# Patient Record
Sex: MALE | Race: BLACK OR AFRICAN AMERICAN | Employment: PART TIME | URBAN - METROPOLITAN AREA
[De-identification: names, ages, dates, MRNs, and addresses within clinical notes are randomized per-mention and may not be internally consistent; named-entity substitution may affect disease eponyms.]

---

## 2021-09-09 ENCOUNTER — HOSPITAL ENCOUNTER (EMERGENCY)
Age: 26
Discharge: HOME | End: 2021-09-10
Attending: EMERGENCY MEDICINE
Payer: COMMERCIAL

## 2021-09-09 VITALS
SYSTOLIC BLOOD PRESSURE: 138 MMHG | WEIGHT: 197 LBS | OXYGEN SATURATION: 99 % | DIASTOLIC BLOOD PRESSURE: 84 MMHG | TEMPERATURE: 97.8 F | RESPIRATION RATE: 16 BRPM | BODY MASS INDEX: 29.18 KG/M2 | HEIGHT: 69 IN | HEART RATE: 81 BPM

## 2021-09-09 DIAGNOSIS — K62.5 RECTAL BLEEDING: ICD-10-CM

## 2021-09-09 DIAGNOSIS — R10.9 ABDOMINAL PAIN OF UNKNOWN ETIOLOGY: ICD-10-CM

## 2021-09-09 DIAGNOSIS — G62.9 PERIPHERAL POLYNEUROPATHY: Primary | ICD-10-CM

## 2021-09-09 LAB
ALBUMIN SERPL-MCNC: 4.2 G/DL (ref 3.5–5)
ALBUMIN/GLOB SERPL: 1.1 {RATIO} (ref 1.2–3.5)
ALP SERPL-CCNC: 60 U/L (ref 50–136)
ALT SERPL-CCNC: 33 U/L (ref 12–65)
ANION GAP SERPL CALC-SCNC: 6 MMOL/L (ref 7–16)
AST SERPL-CCNC: 28 U/L (ref 15–37)
BILIRUB SERPL-MCNC: 0.3 MG/DL (ref 0.2–1.1)
BUN SERPL-MCNC: 18 MG/DL (ref 6–23)
CALCIUM SERPL-MCNC: 9.4 MG/DL (ref 8.3–10.4)
CHLORIDE SERPL-SCNC: 106 MMOL/L (ref 98–107)
CO2 SERPL-SCNC: 31 MMOL/L (ref 21–32)
CREAT SERPL-MCNC: 1 MG/DL (ref 0.8–1.5)
GLOBULIN SER CALC-MCNC: 3.7 G/DL (ref 2.3–3.5)
GLUCOSE SERPL-MCNC: 89 MG/DL (ref 65–100)
LIPASE SERPL-CCNC: 295 U/L (ref 73–393)
POTASSIUM SERPL-SCNC: 3.8 MMOL/L (ref 3.5–5.1)
PROT SERPL-MCNC: 7.9 G/DL (ref 6.3–8.2)
SODIUM SERPL-SCNC: 143 MMOL/L (ref 136–145)

## 2021-09-09 PROCEDURE — 96374 THER/PROPH/DIAG INJ IV PUSH: CPT

## 2021-09-09 PROCEDURE — 99283 EMERGENCY DEPT VISIT LOW MDM: CPT

## 2021-09-09 PROCEDURE — 85025 COMPLETE CBC W/AUTO DIFF WBC: CPT

## 2021-09-09 PROCEDURE — 80053 COMPREHEN METABOLIC PANEL: CPT

## 2021-09-09 PROCEDURE — 83690 ASSAY OF LIPASE: CPT

## 2021-09-09 RX ORDER — SODIUM CHLORIDE 0.9 % (FLUSH) 0.9 %
5-10 SYRINGE (ML) INJECTION AS NEEDED
Status: DISCONTINUED | OUTPATIENT
Start: 2021-09-09 | End: 2021-09-10 | Stop reason: HOSPADM

## 2021-09-09 RX ORDER — SODIUM CHLORIDE 0.9 % (FLUSH) 0.9 %
5-10 SYRINGE (ML) INJECTION EVERY 8 HOURS
Status: DISCONTINUED | OUTPATIENT
Start: 2021-09-09 | End: 2021-09-10 | Stop reason: HOSPADM

## 2021-09-10 ENCOUNTER — APPOINTMENT (OUTPATIENT)
Dept: CT IMAGING | Age: 26
End: 2021-09-10
Attending: EMERGENCY MEDICINE
Payer: COMMERCIAL

## 2021-09-10 LAB
BASOPHILS # BLD: 0.1 K/UL (ref 0–0.2)
BASOPHILS NFR BLD: 1 % (ref 0–2)
DIFFERENTIAL METHOD BLD: ABNORMAL
EOSINOPHIL # BLD: 0.2 K/UL (ref 0–0.8)
EOSINOPHIL NFR BLD: 2 % (ref 0.5–7.8)
ERYTHROCYTE [DISTWIDTH] IN BLOOD BY AUTOMATED COUNT: 15.9 % (ref 11.9–14.6)
HCT VFR BLD AUTO: 42.4 % (ref 41.1–50.3)
HGB BLD-MCNC: 13.2 G/DL (ref 13.6–17.2)
IMM GRANULOCYTES # BLD AUTO: 0.1 K/UL (ref 0–0.5)
IMM GRANULOCYTES NFR BLD AUTO: 1 % (ref 0–5)
LYMPHOCYTES # BLD: 4.6 K/UL (ref 0.5–4.6)
LYMPHOCYTES NFR BLD: 42 % (ref 13–44)
MCH RBC QN AUTO: 22.1 PG (ref 26.1–32.9)
MCHC RBC AUTO-ENTMCNC: 31.1 G/DL (ref 31.4–35)
MCV RBC AUTO: 71.1 FL (ref 79.6–97.8)
MONOCYTES # BLD: 0.8 K/UL (ref 0.1–1.3)
MONOCYTES NFR BLD: 7 % (ref 4–12)
NEUTS SEG # BLD: 5.2 K/UL (ref 1.7–8.2)
NEUTS SEG NFR BLD: 47 % (ref 43–78)
NRBC # BLD: 0 K/UL (ref 0–0.2)
PLATELET # BLD AUTO: 343 K/UL (ref 150–450)
PLATELET COMMENTS,PCOM: ADEQUATE
PMV BLD AUTO: 10.8 FL (ref 9.4–12.3)
RBC # BLD AUTO: 5.96 M/UL (ref 4.23–5.6)
RBC MORPH BLD: ABNORMAL
WBC # BLD AUTO: 11 K/UL (ref 4.3–11.1)
WBC MORPH BLD: ABNORMAL

## 2021-09-10 PROCEDURE — 74011000258 HC RX REV CODE- 258: Performed by: EMERGENCY MEDICINE

## 2021-09-10 PROCEDURE — 74011250637 HC RX REV CODE- 250/637: Performed by: EMERGENCY MEDICINE

## 2021-09-10 PROCEDURE — 70450 CT HEAD/BRAIN W/O DYE: CPT

## 2021-09-10 PROCEDURE — 74011000636 HC RX REV CODE- 636: Performed by: EMERGENCY MEDICINE

## 2021-09-10 PROCEDURE — 96374 THER/PROPH/DIAG INJ IV PUSH: CPT

## 2021-09-10 PROCEDURE — 74177 CT ABD & PELVIS W/CONTRAST: CPT

## 2021-09-10 PROCEDURE — 74011250636 HC RX REV CODE- 250/636: Performed by: EMERGENCY MEDICINE

## 2021-09-10 RX ORDER — HYOSCYAMINE SULFATE 0.12 MG/1
0.25 TABLET SUBLINGUAL
Status: COMPLETED | OUTPATIENT
Start: 2021-09-10 | End: 2021-09-10

## 2021-09-10 RX ORDER — SODIUM CHLORIDE 0.9 % (FLUSH) 0.9 %
10 SYRINGE (ML) INJECTION
Status: COMPLETED | OUTPATIENT
Start: 2021-09-10 | End: 2021-09-10

## 2021-09-10 RX ORDER — ONDANSETRON 8 MG/1
8 TABLET, ORALLY DISINTEGRATING ORAL
Qty: 12 TABLET | Refills: 1 | Status: SHIPPED | OUTPATIENT
Start: 2021-09-10

## 2021-09-10 RX ORDER — HYOSCYAMINE SULFATE 0.12 MG/1
0.25 TABLET SUBLINGUAL
Qty: 20 TABLET | Refills: 0 | OUTPATIENT
Start: 2021-09-10 | End: 2022-02-15

## 2021-09-10 RX ORDER — KETOROLAC TROMETHAMINE 30 MG/ML
30 INJECTION, SOLUTION INTRAMUSCULAR; INTRAVENOUS
Status: COMPLETED | OUTPATIENT
Start: 2021-09-10 | End: 2021-09-10

## 2021-09-10 RX ADMIN — Medication 5 ML: at 02:51

## 2021-09-10 RX ADMIN — IOPAMIDOL 100 ML: 755 INJECTION, SOLUTION INTRAVENOUS at 02:29

## 2021-09-10 RX ADMIN — HYOSCYAMINE SULFATE 0.25 MG: 0.12 TABLET ORAL; SUBLINGUAL at 02:51

## 2021-09-10 RX ADMIN — SODIUM CHLORIDE 100 ML: 900 INJECTION, SOLUTION INTRAVENOUS at 02:30

## 2021-09-10 RX ADMIN — KETOROLAC TROMETHAMINE 30 MG: 30 INJECTION, SOLUTION INTRAMUSCULAR at 02:50

## 2021-09-10 RX ADMIN — Medication 10 ML: at 02:29

## 2021-09-10 NOTE — ED NOTES
I have reviewed discharge instructions with the patient. The patient verbalized understanding. Patient left ED via Discharge Method: ambulatory to Home with self. Opportunity for questions and clarification provided. Patient given 2 scripts. To continue your aftercare when you leave the hospital, you may receive an automated call from our care team to check in on how you are doing. This is a free service and part of our promise to provide the best care and service to meet your aftercare needs.  If you have questions, or wish to unsubscribe from this service please call [phone]. Thank you for Choosing our [facility] Emergency Department.

## 2021-09-10 NOTE — ED PROVIDER NOTES
24 yo presents with abdominal pain and rectal bleeding, onset Monday. Saturday he drank a a good bit of alcohol celebrating his birthday, he awoke fine Sunday  Went to bed at 11 Sunday night, and awoke around 1am to find his right arm numb and weak. Arm sx start distally, and progressively expand proximally to shoulder, at which point he passed out. He eventually came to, but had nausea and vomiting. He kind of remembers falling and passing out, but doesn't recall making his way to the sofa where he had more vomiting. He had abd pain, diffuse, and crampy since, worsening today, and had a formed stool, with blood on the outside this evening,    Has had the whole syndrome repeat 6-7 times since 2019,  Seen in an er once, labs and an abdominal us were ok,  Was told it could be gluten. Can sometimes do breathing exercise to stop the right arm proximal march of symptoms  Syndrome universally occurs when he is laying down, asleep, waking him up  Arm is weak and dead-numb           No past medical history on file. No past surgical history on file. No family history on file. Social History     Socioeconomic History    Marital status: SINGLE     Spouse name: Not on file    Number of children: Not on file    Years of education: Not on file    Highest education level: Not on file   Occupational History    Not on file   Tobacco Use    Smoking status: Not on file   Substance and Sexual Activity    Alcohol use: Not on file    Drug use: Not on file    Sexual activity: Not on file   Other Topics Concern    Not on file   Social History Narrative    Not on file     Social Determinants of Health     Financial Resource Strain:     Difficulty of Paying Living Expenses:    Food Insecurity:     Worried About Running Out of Food in the Last Year:     [address] in the Last Year:    Transportation Needs:     Lack of Transportation (Medical):      Lack of Transportation (Non-Medical):    Physical Activity:     Days of Exercise per Week:     Minutes of Exercise per Session:    Stress:     Feeling of Stress :    Social Connections:     Frequency of Communication with Friends and Family:     Frequency of Social Gatherings with Friends and Family:     Attends Hinduism Services:     Active Member of Clubs or Organizations:     Attends Club or Organization Meetings:     Marital Status:    Intimate Partner Violence:     Fear of Current or Ex-Partner:     Emotionally Abused:     Physically Abused:     Sexually Abused: ALLERGIES: Patient has no known allergies. Review of Systems   Constitutional: Negative for chills and fever. HENT: Negative for rhinorrhea and sore throat. Eyes: Negative for discharge and redness. Respiratory: Negative for cough and shortness of breath. Cardiovascular: Negative for chest pain and palpitations. Gastrointestinal: Positive for abdominal pain and blood in stool. Negative for diarrhea, nausea and vomiting. Musculoskeletal: Negative for arthralgias and back pain. Skin: Negative for rash. Neurological: Positive for weakness and numbness. Negative for dizziness and headaches. All other systems reviewed and are negative. Vitals:    09/09/21 2302   BP: 138/84   Pulse: 81   Resp: 16   Temp: 97.8 °F (36.6 °C)   SpO2: 99%   Weight: 89.4 kg (197 lb)   Height: 5' 9\" (1.753 m)            Physical Exam  Vitals and nursing note reviewed. Constitutional:       General: He is not in acute distress. Appearance: Normal appearance. He is well-developed. He is not ill-appearing, toxic-appearing or diaphoretic. HENT:      Head: Normocephalic and atraumatic. Right Ear: External ear normal.      Left Ear: External ear normal.      Mouth/Throat:      Mouth: Mucous membranes are moist.      Pharynx: Oropharynx is clear. No oropharyngeal exudate or posterior oropharyngeal erythema. Eyes:      General: No scleral icterus.         Right eye: No discharge. Left eye: No discharge. Extraocular Movements: Extraocular movements intact. Conjunctiva/sclera: Conjunctivae normal.      Pupils: Pupils are equal, round, and reactive to light. Neck:      Thyroid: No thyromegaly. Trachea: Trachea normal.   Cardiovascular:      Rate and Rhythm: Normal rate and regular rhythm. Heart sounds: Normal heart sounds. No murmur heard. No gallop. Pulmonary:      Effort: Pulmonary effort is normal. No respiratory distress. Breath sounds: Normal breath sounds. No wheezing or rales. Abdominal:      General: Bowel sounds are normal.      Palpations: Abdomen is soft. There is no hepatomegaly, splenomegaly or pulsatile mass. Tenderness: There is no abdominal tenderness. There is no guarding. Comments: Subjective pain more-so than objective tenderness   Musculoskeletal:         General: No tenderness. Normal range of motion. Cervical back: Normal range of motion and neck supple. Normal range of motion. Lymphadenopathy:      Cervical: No cervical adenopathy. Skin:     General: Skin is warm and dry. Neurological:      General: No focal deficit present. Mental Status: He is alert and oriented to person, place, and time. Mental status is at baseline. Motor: No abnormal muscle tone. Comments: cni 2-12   Nl gait and speech  Nl finger to nose, no drift  , biceps and melissa\ps all 5/5  Nl sensation x5   Psychiatric:         Mood and Affect: Mood normal.         Behavior: Behavior normal.          MDM  Number of Diagnoses or Management Options  Abdominal pain of unknown etiology: established and worsening  Peripheral polyneuropathy: established and worsening  Rectal bleeding: new and requires workup  Diagnosis management comments: Medical decision making note:   Odd constellation of right arm numbness and weaknes which wakes him from sleep, and is followed by abd pain,  abd pain more persistant this time, and has only had the rectal bleeding once before in the 6-7 occurrences over about three years  Ct head, and ct abd/pelvis ok  Refer to neuro and gi  This concludes the \"medical decision making note\" part of this emergency department visit note. Amount and/or Complexity of Data Reviewed  Clinical lab tests: reviewed and ordered (Results Include:    Recent Results (from the past 24 hour(s))  -CBC WITH AUTOMATED DIFF  Collection Time: 09/09/21 11:04 PM       Result                      Value             Ref Range           WBC                         11.0              4.3 - 11.1 K*       RBC                         5.96 (H)          4.23 - 5.6 M*       HGB                         13.2 (L)          13.6 - 17.2 *       HCT                         42.4              41.1 - 50.3 %       MCV                         71.1 (L)          79.6 - 97.8 *       MCH                         22.1 (L)          26.1 - 32.9 *       MCHC                        31.1 (L)          31.4 - 35.0 *       RDW                         15.9 (H)          11.9 - 14.6 %       PLATELET                    343               150 - 450 K/*       MPV                         10.8              9.4 - 12.3 FL       ABSOLUTE NRBC               0.00              0.0 - 0.2 K/*       NEUTROPHILS                 47                43 - 78 %           LYMPHOCYTES                 42                13 - 44 %           MONOCYTES                   7                 4.0 - 12.0 %        EOSINOPHILS                 2                 0.5 - 7.8 %         BASOPHILS                   1                 0.0 - 2.0 %         IMMATURE GRANULOCYTES       1                 0.0 - 5.0 %         ABS. NEUTROPHILS            5.2               1.7 - 8.2 K/*       ABS. LYMPHOCYTES            4.6               0.5 - 4.6 K/*       ABS. MONOCYTES              0.8               0.1 - 1.3 K/*       ABS. EOSINOPHILS            0.2               0.0 - 0.8 K/*       ABS.  BASOPHILS              0.1 0.0 - 0.2 K/*       ABS. IMM. GRANS.            0.1               0.0 - 0.5 K/*       RBC COMMENTS                                                  SLIGHT HYPOCHROMIA        WBC COMMENTS                OCCASIONAL                            PLATELET COMMENTS           ADEQUATE                              DF                          AUTOMATED                        -METABOLIC PANEL, COMPREHENSIVE  Collection Time: 09/09/21 11:04 PM       Result                      Value             Ref Range           Sodium                      143               136 - 145 mm*       Potassium                   3.8               3.5 - 5.1 mm*       Chloride                    106               98 - 107 mmo*       CO2                         31                21 - 32 mmol*       Anion gap                   6 (L)             7 - 16 mmol/L       Glucose                     89                65 - 100 mg/*       BUN                         18                6 - 23 MG/DL        Creatinine                  1.00              0.8 - 1.5 MG*       GFR est AA                  >60               >60 ml/min/1*       GFR est non-AA              >60               >60 ml/min/1*       Calcium                     9.4               8.3 - 10.4 M*       Bilirubin, total            0.3               0.2 - 1.1 MG*       ALT (SGPT)                  33                12 - 65 U/L         AST (SGOT)                  28                15 - 37 U/L         Alk.  phosphatase            60                50 - 136 U/L        Protein, total              7.9               6.3 - 8.2 g/*       Albumin                     4.2               3.5 - 5.0 g/*       Globulin                    3.7 (H)           2.3 - 3.5 g/*       A-G Ratio                   1.1 (L)           1.2 - 3.5      -LIPASE  Collection Time: 09/09/21 11:04 PM       Result                      Value             Ref Range           Lipase                      295               73 - 393 U/L )  Tests in the radiology section of CPT®: ordered and reviewed (CT HEAD WO CONT   Final Result    Normal noncontrast CT head.      CT ABD PELV W CONT   Final Result    No acute abnormality.         )    Risk of Complications, Morbidity, and/or Mortality  Presenting problems: moderate  Diagnostic procedures: low  Management options: minimal    Patient Progress  Patient progress: stable         Procedures

## 2021-09-10 NOTE — ED TRIAGE NOTES
Arrives with face mask in place. Reports bright red blood to stool onset Sunday. Reports \"I dont know what happened. My right arm locked up, I had loud ringing and then I blacked out\". States 1 episode of blood to stool following syncopal episode. Reports normal BMs since then. Now with reports generalized abdominal pain. Denies n/v. Report similar episode 1 year ago. Reports seen at time, normal lab results and discharged.  Told possible allergic to gluten, states \"that cant be accurate\"

## 2022-02-15 ENCOUNTER — HOSPITAL ENCOUNTER (EMERGENCY)
Age: 27
Discharge: HOME | End: 2022-02-15
Attending: EMERGENCY MEDICINE

## 2022-02-15 ENCOUNTER — APPOINTMENT (OUTPATIENT)
Dept: GENERAL RADIOLOGY | Age: 27
End: 2022-02-15
Attending: PHYSICIAN ASSISTANT

## 2022-02-15 VITALS
HEART RATE: 109 BPM | HEIGHT: 69 IN | RESPIRATION RATE: 16 BRPM | TEMPERATURE: 98.2 F | SYSTOLIC BLOOD PRESSURE: 128 MMHG | WEIGHT: 197 LBS | DIASTOLIC BLOOD PRESSURE: 75 MMHG | OXYGEN SATURATION: 98 % | BODY MASS INDEX: 29.18 KG/M2

## 2022-02-15 DIAGNOSIS — R11.2 NON-INTRACTABLE VOMITING WITH NAUSEA, UNSPECIFIED VOMITING TYPE: Primary | ICD-10-CM

## 2022-02-15 DIAGNOSIS — R10.84 ABDOMINAL PAIN, GENERALIZED: ICD-10-CM

## 2022-02-15 LAB
ALBUMIN SERPL-MCNC: 4 G/DL (ref 3.5–5)
ALBUMIN/GLOB SERPL: 0.9 {RATIO} (ref 1.2–3.5)
ALP SERPL-CCNC: 81 U/L (ref 50–136)
ALT SERPL-CCNC: 34 U/L (ref 12–65)
ANION GAP SERPL CALC-SCNC: 11 MMOL/L (ref 7–16)
AST SERPL-CCNC: 32 U/L (ref 15–37)
ATRIAL RATE: 108 BPM
BASOPHILS # BLD: 0.1 K/UL (ref 0–0.2)
BASOPHILS NFR BLD: 1 % (ref 0–2)
BILIRUB SERPL-MCNC: 0.3 MG/DL (ref 0.2–1.1)
BILIRUB UR QL: NEGATIVE
BUN SERPL-MCNC: 13 MG/DL (ref 6–23)
CALCIUM SERPL-MCNC: 9.8 MG/DL (ref 8.3–10.4)
CALCULATED P AXIS, ECG09: 64 DEGREES
CALCULATED R AXIS, ECG10: 72 DEGREES
CALCULATED T AXIS, ECG11: 42 DEGREES
CHLORIDE SERPL-SCNC: 104 MMOL/L (ref 98–107)
CO2 SERPL-SCNC: 22 MMOL/L (ref 21–32)
COVID-19 RAPID TEST, COVR: NOT DETECTED
CREAT SERPL-MCNC: 1.4 MG/DL (ref 0.8–1.5)
DIAGNOSIS, 93000: NORMAL
DIFFERENTIAL METHOD BLD: ABNORMAL
EOSINOPHIL # BLD: 0.3 K/UL (ref 0–0.8)
EOSINOPHIL NFR BLD: 2 % (ref 0.5–7.8)
ERYTHROCYTE [DISTWIDTH] IN BLOOD BY AUTOMATED COUNT: 16.9 % (ref 11.9–14.6)
ETHANOL SERPL-MCNC: <3 MG/DL
FLUAV AG NPH QL IA: NEGATIVE
FLUBV AG NPH QL IA: NEGATIVE
GLOBULIN SER CALC-MCNC: 4.4 G/DL (ref 2.3–3.5)
GLUCOSE SERPL-MCNC: 100 MG/DL (ref 65–100)
GLUCOSE UR QL STRIP.AUTO: NEGATIVE MG/DL
HCT VFR BLD AUTO: 45.2 % (ref 41.1–50.3)
HGB BLD-MCNC: 14.1 G/DL (ref 13.6–17.2)
IMM GRANULOCYTES # BLD AUTO: 0.1 K/UL (ref 0–0.5)
IMM GRANULOCYTES NFR BLD AUTO: 1 % (ref 0–5)
KETONES UR-MCNC: NEGATIVE MG/DL
LEUKOCYTE ESTERASE UR QL STRIP: NEGATIVE
LIPASE SERPL-CCNC: 156 U/L (ref 73–393)
LYMPHOCYTES # BLD: 4.8 K/UL (ref 0.5–4.6)
LYMPHOCYTES NFR BLD: 38 % (ref 13–44)
MCH RBC QN AUTO: 22.1 PG (ref 26.1–32.9)
MCHC RBC AUTO-ENTMCNC: 31.2 G/DL (ref 31.4–35)
MCV RBC AUTO: 70.7 FL (ref 79.6–97.8)
MONOCYTES # BLD: 1.1 K/UL (ref 0.1–1.3)
MONOCYTES NFR BLD: 9 % (ref 4–12)
NEUTS SEG # BLD: 6.3 K/UL (ref 1.7–8.2)
NEUTS SEG NFR BLD: 49 % (ref 43–78)
NITRITE UR QL: NEGATIVE
NRBC # BLD: 0 K/UL (ref 0–0.2)
P-R INTERVAL, ECG05: 152 MS
PH UR: 5.5 [PH] (ref 5–9)
PLATELET # BLD AUTO: 329 K/UL (ref 150–450)
PLATELET COMMENTS,PCOM: ADEQUATE
PMV BLD AUTO: 10.5 FL (ref 9.4–12.3)
POTASSIUM SERPL-SCNC: 3.7 MMOL/L (ref 3.5–5.1)
PROT SERPL-MCNC: 8.4 G/DL (ref 6.3–8.2)
PROT UR QL: NEGATIVE MG/DL
Q-T INTERVAL, ECG07: 318 MS
QRS DURATION, ECG06: 86 MS
QTC CALCULATION (BEZET), ECG08: 426 MS
RBC # BLD AUTO: 6.39 M/UL (ref 4.23–5.6)
RBC # UR STRIP: ABNORMAL /UL
RBC MORPH BLD: ABNORMAL
RBC MORPH BLD: ABNORMAL
SERVICE CMNT-IMP: ABNORMAL
SODIUM SERPL-SCNC: 137 MMOL/L (ref 138–145)
SOURCE, COVRS: NORMAL
SP GR UR: 1.01 (ref 1–1.02)
SPECIMEN SOURCE: NORMAL
STREP,MOLECULAR STRPM: NOT DETECTED
UROBILINOGEN UR QL: 0.2 EU/DL (ref 0.2–1)
VENTRICULAR RATE, ECG03: 108 BPM
WBC # BLD AUTO: 12.7 K/UL (ref 4.3–11.1)
WBC MORPH BLD: ABNORMAL

## 2022-02-15 PROCEDURE — 93005 ELECTROCARDIOGRAM TRACING: CPT

## 2022-02-15 PROCEDURE — 96361 HYDRATE IV INFUSION ADD-ON: CPT

## 2022-02-15 PROCEDURE — 81003 URINALYSIS AUTO W/O SCOPE: CPT

## 2022-02-15 PROCEDURE — 74022 RADEX COMPL AQT ABD SERIES: CPT

## 2022-02-15 PROCEDURE — 96374 THER/PROPH/DIAG INJ IV PUSH: CPT

## 2022-02-15 PROCEDURE — 85025 COMPLETE CBC W/AUTO DIFF WBC: CPT

## 2022-02-15 PROCEDURE — 99285 EMERGENCY DEPT VISIT HI MDM: CPT

## 2022-02-15 PROCEDURE — 87804 INFLUENZA ASSAY W/OPTIC: CPT

## 2022-02-15 PROCEDURE — 87635 SARS-COV-2 COVID-19 AMP PRB: CPT

## 2022-02-15 PROCEDURE — 80053 COMPREHEN METABOLIC PANEL: CPT

## 2022-02-15 PROCEDURE — 74011250636 HC RX REV CODE- 250/636: Performed by: PHYSICIAN ASSISTANT

## 2022-02-15 PROCEDURE — 83690 ASSAY OF LIPASE: CPT

## 2022-02-15 PROCEDURE — 82077 ASSAY SPEC XCP UR&BREATH IA: CPT

## 2022-02-15 RX ORDER — HYOSCYAMINE SULFATE 0.125 MG
125 TABLET ORAL 3 TIMES DAILY
Qty: 30 TABLET | Refills: 1 | Status: SHIPPED | OUTPATIENT
Start: 2022-02-15 | End: 2022-02-16 | Stop reason: SDUPTHER

## 2022-02-15 RX ORDER — SODIUM CHLORIDE 0.9 % (FLUSH) 0.9 %
5-10 SYRINGE (ML) INJECTION AS NEEDED
Status: DISCONTINUED | OUTPATIENT
Start: 2022-02-15 | End: 2022-02-15 | Stop reason: HOSPADM

## 2022-02-15 RX ORDER — SODIUM CHLORIDE 0.9 % (FLUSH) 0.9 %
5-10 SYRINGE (ML) INJECTION EVERY 8 HOURS
Status: DISCONTINUED | OUTPATIENT
Start: 2022-02-15 | End: 2022-02-15 | Stop reason: HOSPADM

## 2022-02-15 RX ORDER — ONDANSETRON 2 MG/ML
4 INJECTION INTRAMUSCULAR; INTRAVENOUS
Status: COMPLETED | OUTPATIENT
Start: 2022-02-15 | End: 2022-02-15

## 2022-02-15 RX ADMIN — ONDANSETRON 4 MG: 2 INJECTION INTRAMUSCULAR; INTRAVENOUS at 08:54

## 2022-02-15 RX ADMIN — SODIUM CHLORIDE 1000 ML: 900 INJECTION, SOLUTION INTRAVENOUS at 08:53

## 2022-02-15 NOTE — Clinical Note
[address] [facility] EMERGENCY DEPT   [facility] [address]  [phone]    Work/School Note    Date: 2/15/2022    To Whom It May concern:      Patricia Marlow was seen and treated today in the emergency room by the following provider(s):  Attending Provider: Shania Rae MD  Physician Assistant: SUZIE Dillon. Patricia Marlow is excused from work/school on 02/15/22. He is clear to return to work/school on 02/16/22.         Sincerely,          SUZIE Booker

## 2022-02-15 NOTE — Clinical Note
[address] [facility] EMERGENCY DEPT   [facility] [address]  [phone]    Work/School Note    Date: 2/15/2022    To Whom It May concern:      Candelario Dancer was seen and treated today in the emergency room by the following provider(s):  Attending Provider: Emani Duong MD  Physician Assistant: SUZIE Márquez. Candelario Dancer is excused from work/school on 02/15/22. He is clear to return to work/school on 02/16/22.         Sincerely,          Jet Mckeon RN

## 2022-02-15 NOTE — DISCHARGE INSTRUCTIONS
Use meds as directed, wean yourself from laxative treatment, call office of choice to arrange follow-up appointment, return to ER symptoms worsen

## 2022-02-15 NOTE — PROGRESS NOTES
ROYSSW met with pt and friend at bedside. Pt will be discharged home today with friend for support if needed. Pt is employed but uninsured. Normally independent with ADL's. Provided handouts/applications for [facility] and [facility] and encouraged pt to follow up to establish care so that medical follow up will be available to pt when/if needed in the future. Pt has completed a [place] financial assistance application while here today. No other concerns expressed or identified at this time.

## 2022-02-15 NOTE — ED TRIAGE NOTES
Arrives without face mask in place. Accompanied by friend/roommate of whom assists pt in ambulating to triage room. Friend reports heard pt stumbling to bathroom and vomiting approx 0500. Found pt lying on bathroom floor, assisted him up and brought him to ED. Denies abdominal pain, diarrhea. Denies ETOH.  Fully vaccinated

## 2022-02-15 NOTE — ED PROVIDER NOTES
Patient to ER complaining of nausea vomiting this morning and syncopal episode. Patient states he remembers getting out of bed feeling nauseous, his partner states patient vomited and briefly lost consciousness. He had no seizure type activity. No head trauma. Patient has a history of the same. Patient states he has been having episodes of early morning abdominal pain and nausea for over a year. He has had ultrasounds abdominal CTs all with negative results. States episodes rarely  happened during the day. [place] and [place] local physician. Seen here in September for similar episode. Again CT scan and labs were all normal at that time. Patient does states she takes a laxative every other day to keep up with possible IBS symptoms. Patient states he has not had a colonoscopy    The history is provided by the patient. Vomiting  This is a recurrent problem. The current episode started 1 to 2 hours ago. The problem occurs every several days. The problem has not changed since onset. Pertinent negatives include no chest pain, no abdominal pain, no headaches and no shortness of breath. Associated symptoms comments: Sore throat from vomiting. Nothing aggravates the symptoms. Nothing relieves the symptoms. He has tried nothing for the symptoms. The treatment provided no relief. No past medical history on file. No past surgical history on file. No family history on file.     Social History     Socioeconomic History    Marital status: SINGLE     Spouse name: Not on file    Number of children: Not on file    Years of education: Not on file    Highest education level: Not on file   Occupational History    Not on file   Tobacco Use    Smoking status: Not on file    Smokeless tobacco: Not on file   Substance and Sexual Activity    Alcohol use: Not on file    Drug use: Not on file    Sexual activity: Not on file   Other Topics Concern    Not on file   Social History Narrative    Not on file     Social Determinants of Health     Financial Resource Strain:     Difficulty of Paying Living Expenses: Not on file   Food Insecurity:     Worried About Running Out of Food in the Last Year: Not on file    Leonela of Food in the Last Year: Not on file   Transportation Needs:     Lack of Transportation (Medical): Not on file    Lack of Transportation (Non-Medical): Not on file   Physical Activity:     Days of Exercise per Week: Not on file    Minutes of Exercise per Session: Not on file   Stress:     Feeling of Stress : Not on file   Social Connections:     Frequency of Communication with Friends and Family: Not on file    Frequency of Social Gatherings with Friends and Family: Not on file    Attends Methodist Services: Not on file    Active Member of [address] [place] [address] [employer] or Organizations: Not on file    Attends Club or Organization Meetings: Not on file    Marital Status: Not on file   Intimate Partner Violence:     Fear of Current or Ex-Partner: Not on file    Emotionally Abused: Not on file    Physically Abused: Not on file    Sexually Abused: Not on file   Housing Stability:     Unable to Pay for Housing in the Last Year: Not on file    Number of [place] in the Last Year: Not on file    Unstable Housing in the Last Year: Not on file         ALLERGIES: Patient has no known allergies. Review of Systems   Respiratory: Negative for shortness of breath. Cardiovascular: Negative for chest pain. Gastrointestinal: Positive for vomiting. Negative for abdominal pain. Neurological: Negative for headaches. All other systems reviewed and are negative. Vitals:    02/15/22 0625   BP: 123/73   Pulse: (!) 109   Resp: 16   Temp: 98.2 °F (36.8 °C)   SpO2: 98%   Weight: 89.4 kg (197 lb)   Height: 5' 9\" (1.753 m)            Physical Exam  Vitals and nursing note reviewed. Constitutional:       General: He is not in acute distress. Appearance: Normal appearance.  He is well-developed and normal weight. He is not diaphoretic. HENT:      Head: Normocephalic and atraumatic. Right Ear: Tympanic membrane and external ear normal.      Left Ear: Tympanic membrane and external ear normal.      Nose: Nose normal.      Mouth/Throat:      Mouth: Mucous membranes are moist.      Pharynx: Posterior oropharyngeal erythema present. Eyes:      Pupils: Pupils are equal, round, and reactive to light. Cardiovascular:      Rate and Rhythm: Normal rate and regular rhythm. Pulmonary:      Effort: Pulmonary effort is normal.      Breath sounds: Normal breath sounds. No wheezing or rhonchi. Abdominal:      General: Abdomen is flat. Bowel sounds are normal.      Palpations: Abdomen is soft. Tenderness: There is no abdominal tenderness. Hernia: No hernia is present. Musculoskeletal:         General: Normal range of motion. Cervical back: Normal range of motion and neck supple. Skin:     General: Skin is warm. Neurological:      General: No focal deficit present. Mental Status: He is alert and oriented to person, place, and time. Psychiatric:         Mood and Affect: Mood normal.         Behavior: Behavior normal.          MDM  Number of Diagnoses or Management Options  Diagnosis management comments: All labs and x-rays are normal.  Patient tolerating p.o. Will place on Levsin and referred to primary care. Patient may need to eventually see gastroenterologist  With symptoms mainly early morning awakening from his sleep and not really during the day feel this may help.   Also encouraged patient who admits to taking every other day laxatives to wean himself from this regime, increase fruits and vegetables in his diet       Amount and/or Complexity of Data Reviewed  Clinical lab tests: ordered and reviewed  Tests in the radiology section of CPT®: ordered and reviewed  Review and summarize past medical records: yes    Risk of Complications, Morbidity, and/or Mortality  Presenting problems: moderate  Diagnostic procedures: moderate  Management options: low    Patient Progress  Patient progress: improved         Procedures

## 2022-02-15 NOTE — ED NOTES
I have reviewed discharge instructions with the patient. The patient verbalized understanding. Patient left ED via Discharge Method: ambulatory to Home with friend. Opportunity for questions and clarification provided. Patient given 1 scripts. To continue your aftercare when you leave the hospital, you may receive an automated call from our care team to check in on how you are doing. This is a free service and part of our promise to provide the best care and service to meet your aftercare needs.  If you have questions, or wish to unsubscribe from this service please call [phone]. Thank you for Choosing our [facility] Emergency Department.

## 2022-02-16 RX ORDER — HYOSCYAMINE SULFATE 0.125 MG
125 TABLET ORAL 3 TIMES DAILY
Qty: 30 TABLET | Refills: 1 | Status: SHIPPED | OUTPATIENT
Start: 2022-02-16 | End: 2022-03-08

## 2022-06-19 ENCOUNTER — HOSPITAL ENCOUNTER (EMERGENCY)
Age: 27
Discharge: HOME | End: 2022-06-19
Attending: EMERGENCY MEDICINE

## 2022-06-19 ENCOUNTER — HOSPITAL ENCOUNTER (EMERGENCY)
Dept: CT IMAGING | Age: 27
Discharge: HOME | End: 2022-06-22

## 2022-06-19 VITALS
WEIGHT: 190 LBS | HEART RATE: 78 BPM | DIASTOLIC BLOOD PRESSURE: 62 MMHG | OXYGEN SATURATION: 98 % | BODY MASS INDEX: 28.14 KG/M2 | TEMPERATURE: 97 F | RESPIRATION RATE: 17 BRPM | HEIGHT: 69 IN | SYSTOLIC BLOOD PRESSURE: 115 MMHG

## 2022-06-19 DIAGNOSIS — R56.9 SEIZURE (HCC): Primary | ICD-10-CM

## 2022-06-19 LAB
ALBUMIN SERPL-MCNC: 3.6 G/DL (ref 3.5–5)
ALBUMIN/GLOB SERPL: 1 {RATIO} (ref 1.2–3.5)
ALP SERPL-CCNC: 69 U/L (ref 50–136)
ALT SERPL-CCNC: 23 U/L (ref 12–65)
ANION GAP SERPL CALC-SCNC: 8 MMOL/L (ref 7–16)
AST SERPL-CCNC: 31 U/L (ref 15–37)
BASOPHILS # BLD: 0.1 K/UL (ref 0–0.2)
BASOPHILS NFR BLD: 1 % (ref 0–2)
BILIRUB SERPL-MCNC: 0.3 MG/DL (ref 0.2–1.1)
BUN SERPL-MCNC: 9 MG/DL (ref 6–23)
CALCIUM SERPL-MCNC: 8.7 MG/DL (ref 8.3–10.4)
CHLORIDE SERPL-SCNC: 110 MMOL/L (ref 98–107)
CO2 SERPL-SCNC: 24 MMOL/L (ref 21–32)
CREAT SERPL-MCNC: 1.2 MG/DL (ref 0.8–1.5)
DIFFERENTIAL METHOD BLD: ABNORMAL
EKG ATRIAL RATE: 97 BPM
EKG DIAGNOSIS: NORMAL
EKG P AXIS: 62 DEGREES
EKG P-R INTERVAL: 161 MS
EKG Q-T INTERVAL: 340 MS
EKG QRS DURATION: 84 MS
EKG QTC CALCULATION (BAZETT): 430 MS
EKG R AXIS: 65 DEGREES
EKG T AXIS: 45 DEGREES
EKG VENTRICULAR RATE: 96 BPM
EOSINOPHIL # BLD: 0.2 K/UL (ref 0–0.8)
EOSINOPHIL NFR BLD: 2 % (ref 0.5–7.8)
ERYTHROCYTE [DISTWIDTH] IN BLOOD BY AUTOMATED COUNT: 16.4 % (ref 11.9–14.6)
GLOBULIN SER CALC-MCNC: 3.6 G/DL (ref 2.3–3.5)
GLUCOSE SERPL-MCNC: 74 MG/DL (ref 65–100)
HCT VFR BLD AUTO: 40.7 % (ref 41.1–50.3)
HGB BLD-MCNC: 12.9 G/DL (ref 13.6–17.2)
IMM GRANULOCYTES # BLD AUTO: 0.1 K/UL (ref 0–0.5)
IMM GRANULOCYTES NFR BLD AUTO: 1 % (ref 0–5)
LYMPHOCYTES # BLD: 2.6 K/UL (ref 0.5–4.6)
LYMPHOCYTES NFR BLD: 26 % (ref 13–44)
MAGNESIUM SERPL-MCNC: 2.7 MG/DL (ref 1.8–2.4)
MCH RBC QN AUTO: 21.9 PG (ref 26.1–32.9)
MCHC RBC AUTO-ENTMCNC: 31.7 G/DL (ref 31.4–35)
MCV RBC AUTO: 69.1 FL (ref 79.6–97.8)
MONOCYTES # BLD: 0.9 K/UL (ref 0.1–1.3)
MONOCYTES NFR BLD: 9 % (ref 4–12)
NEUTS SEG # BLD: 6.1 K/UL (ref 1.7–8.2)
NEUTS SEG NFR BLD: 61 % (ref 43–78)
NRBC # BLD: 0 K/UL (ref 0–0.2)
PLATELET # BLD AUTO: 334 K/UL (ref 150–450)
PMV BLD AUTO: 11.1 FL (ref 9.4–12.3)
POTASSIUM SERPL-SCNC: 4 MMOL/L (ref 3.5–5.1)
PROT SERPL-MCNC: 7.2 G/DL (ref 6.3–8.2)
RBC # BLD AUTO: 5.89 M/UL (ref 4.23–5.6)
SODIUM SERPL-SCNC: 142 MMOL/L (ref 138–145)
TSH, 3RD GENERATION: 2.47 UIU/ML (ref 0.36–3.74)
WBC # BLD AUTO: 10 K/UL (ref 4.3–11.1)

## 2022-06-19 PROCEDURE — 83735 ASSAY OF MAGNESIUM: CPT

## 2022-06-19 PROCEDURE — 84443 ASSAY THYROID STIM HORMONE: CPT

## 2022-06-19 PROCEDURE — 99284 EMERGENCY DEPT VISIT MOD MDM: CPT

## 2022-06-19 PROCEDURE — 85025 COMPLETE CBC W/AUTO DIFF WBC: CPT

## 2022-06-19 PROCEDURE — 80053 COMPREHEN METABOLIC PANEL: CPT

## 2022-06-19 PROCEDURE — 70450 CT HEAD/BRAIN W/O DYE: CPT

## 2022-06-19 ASSESSMENT — ENCOUNTER SYMPTOMS
EYE REDNESS: 0
VOMITING: 0
SORE THROAT: 0
SHORTNESS OF BREATH: 0
COUGH: 0
NAUSEA: 0
WHEEZING: 0
DIARRHEA: 0
ABDOMINAL PAIN: 0
COLOR CHANGE: 0

## 2022-06-19 NOTE — ED NOTES
I have reviewed discharge instructions with the patient. The patient verbalized understanding. Patient left ED via Discharge Method: ambulatory to Home with friends. Opportunity for questions and clarification provided. Patient given 0 scripts. To continue your aftercare when you leave the hospital, you may receive an automated call from our care team to check in on how you are doing. This is a free service and part of our promise to provide the best care and service to meet your aftercare needs.  If you have questions, or wish to unsubscribe from this service please call [phone]. Thank you for Choosing our [facility] Emergency Department.         Riki SandovalPenn Highlands Healthcare  06/19/22 5231

## 2022-06-19 NOTE — ED PROVIDER NOTES
[facility] Emergency Department Provider Note                   PCP:                No primary care provider on file. Age: 23 y.o. Sex: male     No diagnosis found. DISPOSITION         New Prescriptions    No medications on file       Orders Placed This Encounter   Procedures    CT HEAD WO CONTRAST (Select for new onset seizures or head trauma)    CBC with Auto Differential    CMP    Magnesium    TSH    POCT Urine Dipstick    Pulse Oximetry    Seizure precautions        Errol Almanzar MD 10:46 AM      MDM  Number of Diagnoses or Management Options  Diagnosis management comments: We will get a CT of his head and check his basic blood work. Review of his record indicates that there is some question on whether his episodes are syncopal or seizure. Today's episode certainly seemed more seizure-like. With this uncertainty I will not empirically start him on any antiepileptics and I will refer him to neurology and a primary care doctor. Selene Lambert is a 23 y.o. male who presents to the Emergency Department with chief complaint of    Chief Complaint   Patient presents with    Seizures      71-year-old gentleman presents with concerns about a seizure that he had today. EMS reported that the roommate called because he heard a thud. Patient says that he has had 23 different episodes or some like this is happened. He notes that it started the first time when he was living in [place]. He says he has never had an EEG and was told that he did not need 1. He says he is not on any antiseizure treatment and does not take any other medications for any other medical problems. He denies any recent fevers or chills. He has had no nausea, vomiting, or diarrhea. He was very postictal with EMS and currently he says he just feels sore all over. No other associated symptoms. Elements of this note were created using speech recognition software.   As such, errors of speech recognition may be present. Review of Systems   Constitutional: Negative for activity change, chills and fever. HENT: Negative for congestion and sore throat. Eyes: Negative for redness and visual disturbance. Respiratory: Negative for cough, shortness of breath and wheezing. Cardiovascular: Negative for chest pain and palpitations. Gastrointestinal: Negative for abdominal pain, diarrhea, nausea and vomiting. Endocrine: Negative for polydipsia and polyuria. Genitourinary: Negative for flank pain and hematuria. Musculoskeletal: Negative for joint swelling and myalgias. Skin: Negative for color change and rash. Allergic/Immunologic: Negative for immunocompromised state. Neurological: Positive for seizures. Negative for dizziness, light-headedness and headaches. Hematological: Negative for adenopathy. Psychiatric/Behavioral: Negative for confusion. No past medical history on file. No past surgical history on file. No family history on file. Social Connections:     Frequency of Communication with Friends and Family: Not on file    Frequency of Social Gatherings with Friends and Family: Not on file    Attends Tenri[employer] Services: Not on file    Active Member of Clubs or Organizations: Not on file    Attends Club or Organization Meetings: Not on file    Marital Status: Not on file        No Known Allergies     Vitals signs and nursing note reviewed. No data found. Physical Exam  Vitals and nursing note reviewed. Constitutional:       Appearance: Normal appearance. HENT:      Head: Normocephalic and atraumatic. Mouth/Throat:      Mouth: Mucous membranes are moist.   Eyes:      General:         Right eye: No discharge. Left eye: No discharge. Cardiovascular:      Rate and Rhythm: Normal rate and regular rhythm. Pulses: Normal pulses.    Pulmonary:      Effort: Pulmonary effort is normal.      Breath sounds: Normal breath sounds. No wheezing. Abdominal:      General: Bowel sounds are normal.      Tenderness: There is no abdominal tenderness. There is no guarding or rebound. Musculoskeletal:      Right lower leg: No edema. Left lower leg: No edema. Lymphadenopathy:      Cervical: No cervical adenopathy. Skin:     Coloration: Skin is not jaundiced. Neurological:      General: No focal deficit present. Mental Status: He is alert and oriented to person, place, and time. Mental status is at baseline. Procedures        Labs Reviewed   CBC WITH AUTO DIFFERENTIAL   COMPREHENSIVE METABOLIC PANEL   MAGNESIUM   TSH        CT HEAD WO CONTRAST (Select for new onset seizures or head trauma)    (Results Pending)                      ED Course as of 06/19/22 1046   Sun Jun 19, 2022   1044 EKG review by ER doctor:Normal sinus rhythmNo acute ischemic ST segment changesNo QTC prolongationRate of: 96 [AC]      ED Course User Index  [AC] Cornelius Gonzáles MD        Voice dictation software was used during the making of this note. This software is not perfect and grammatical and other typographical errors may be present. This note has not been completely proofread for errors.        Cornelius Gonzáles MD  06/19/22 8568

## 2022-06-19 NOTE — ED TRIAGE NOTES
Pt arrives via [facility] from home, pt's roommate called out for seizure-like activity. Roommate heard a thud in his room and found pt stiff and shaking, this lasted for about 30 seconds. Upon EMS arrival pt was post ictal, pt states this was the second time this happened to him. This happened to pt for the first time in 2019 when he was living in [place], pt not officially dx with seizures, not on any seizure medication. Pt is currently A&O x4, denies chest pain, shob, abd pain, n/v/d, fever/chills. Pt takes vitamins daily, denies any other daily medications, denies any medical conditions, denies any alcohol or drug use, denies any recent prolonged sun exposure. Pt states he has been staying hydrated and drinks water daily.